# Patient Record
Sex: FEMALE | Race: WHITE | NOT HISPANIC OR LATINO | Employment: STUDENT | URBAN - METROPOLITAN AREA
[De-identification: names, ages, dates, MRNs, and addresses within clinical notes are randomized per-mention and may not be internally consistent; named-entity substitution may affect disease eponyms.]

---
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
## 2025-01-02 PROBLEM — R06.02 SOB (SHORTNESS OF BREATH): Status: ACTIVE | Noted: 2025-01-02

## 2025-07-24 ENCOUNTER — PATIENT OUTREACH (OUTPATIENT)
Dept: ADMINISTRATIVE | Facility: HOSPITAL | Age: 20
End: 2025-07-24
Payer: MEDICAID

## 2025-07-24 NOTE — LETTER
AUTHORIZATION FOR RELEASE OF   CONFIDENTIAL INFORMATION        We are seeing Yareli Landrum, date of birth 2005, in the clinic at [facility]. Jacqui Sevilla NP is the patient's PCP. Yareli Landrum has an outstanding lab/procedure at the time we reviewed her chart. In order to help keep her health information updated, she has authorized us to request the following medical record(s):                                               ( x )  OUTSIDE LAB RESULTS                                              Chlamydia Screening         Please fax records to [facility], Naquin, Sabrina H., NP,  at [phone]   or email to [email or web address].     If you have any questions, please contact Trice at [phone].         Patient Name: Yareli Landrum  : 2005  Patient Phone #: [phone]

## 2025-07-24 NOTE — PROGRESS NOTES
Portal active: yes  Chart reviewed, immunization record updated.  No new results noted on [facility] or [facility] web site.  Care Everywhere updated.   Patient care coordination note  Next PCP visit 01/08/2026  LOV with PCP 01/08/2025    Spoke to pt about scheduling a GYN appointment, it has been over a year since last annual. She sees FNP Karina Coto. Pt is on the gap report for chlamydia. Sending an e-fax to Karina Coto to request screening.